# Patient Record
(demographics unavailable — no encounter records)

---

## 2025-04-17 NOTE — HISTORY OF PRESENT ILLNESS
[FreeTextEntry1] : 28yo nonbinary person designated female at birth (Dorene; they/them) presents for consultation for top surgery. States they want their chest "androgenous but maybe flatter/masc". Patient is not on T. Currently binds with a binder. Denies any recent lumps/bumps, or other breast changes. No history of breast imaging. Denies any family history of melanoma, breast, ovarian, or pancreatic cancer. Denies any family history of DVT/clots. Denies any personal or family history of issues with general anesthesia. States that nipple sensation is "somewhat important" (2/5 of importance). No plans to ever breast feed and expresses understanding that this procedure would render the patient unable to do so.  Patient works in . Lives with supportive partner who will be helpful after surgery. Endorses daily tobacco use. The patient expresses understanding of the need to completely off tobacco or nicotine products and to obtain negative urine nicotine metabolite test prior to insurance authorization. Endorses marijuana use. The patient agreed to avoid all marijuana smoking for 6 weeks before surgery and 6 weeks after surgery and to avoid all THC use for 2 weeks before and after surgery. Drinks some alcohol (2x a week) and denies any other recreational drug use. Patient denies any history of psychiatric hospitalization or ER admission.

## 2025-04-17 NOTE — END OF VISIT
Event Note [Time Spent: ___ minutes] : I have spent [unfilled] minutes of time on the encounter which excludes teaching and separately reported services.

## 2025-04-17 NOTE — PHYSICAL EXAM
[de-identified] : NAD. BMI 26 [de-identified] : Breast exam was performed with a medical chaperone present (DM). Minimal bilateral lateral chest rolls. No dominant masses, skin changes, nipple retraction, or palpable axillary lymphadenopathy. SN->N distance is 30 cm on the left and 30.5 cm on the right; width is 18 cm on the left and 19 cm on the right; N->IMF is 13 cm on the left and 13 cm on the right. Chest circumference is approximately 92 cm. There is bilateral grade 2 ptosis. [de-identified] : Normal respiratory effort.

## 2025-04-17 NOTE — ASSESSMENT
[FreeTextEntry1] : The patient is a candidate for either double incision top surgery with free nipple grafting or without nipple preservation. We may have to cross the midline during this procedure. Minimal bilateral chest wall liposuction would aid in reducing the stigmata/hollow appearance following subtotal mastectomy and also help address the lateral chest rolls, which are a common site of persistent dysphoria. They express understanding of the risks associated with surgery. To proceed, they will need 1 mental health letter of assessment and a negative nicotine test in order to proceed with insurance submission. If they decide to go with no nipples, their mental health letter needs to indicate that. They will need to decide on their goals and technique decision.   I, Dr. Riggs, personally performed the evaluation and management (E/M) services for this new patient. That E/M includes conducting the clinically appropriate initial history &/or exam, assessing all conditions, and establishing the plan of care. Today, my SAVANNAH, Cliff Gramajo PA-C, was here to observe my evaluation and management service for this patient & follow plan of care established by me going forward.

## 2025-04-17 NOTE — PHYSICAL EXAM
[de-identified] : NAD. BMI 26 [de-identified] : Breast exam was performed with a medical chaperone present (DM). Minimal bilateral lateral chest rolls. No dominant masses, skin changes, nipple retraction, or palpable axillary lymphadenopathy. SN->N distance is 30 cm on the left and 30.5 cm on the right; width is 18 cm on the left and 19 cm on the right; N->IMF is 13 cm on the left and 13 cm on the right. Chest circumference is approximately 92 cm. There is bilateral grade 2 ptosis. [de-identified] : Normal respiratory effort.

## 2025-06-19 NOTE — HISTORY OF PRESENT ILLNESS
[Suicidal Behavior/Ideation] : suicidal behavior/ideation [Not Applicable] : Not applicable [FreeTextEntry1] : pt is a 28 y/o gender non binary AFAB, lives with their partner and works as a , with hx of anxiety and depression, has responded to zoloft but at this time is not taking it, former smoker and stopped in April 2025, seeking top surgery clearance letter   pron they/them sexuality: queer  as a child grew up presenting tomboy. They reached puberty later than peer around age 15-17 y/o. They started noticing a difference and identified internally as queer in high school and came out in college. They add that during pandemic they explored their gender more and identified as non binary. They have had distress with development after puberty. Chest has been a major stress and as they grew bigger the distress increased. They have been contemplating top surgery for the past 3 years and last year binding which has reduced the distress.  They have researched the past 6 months about methods and also anecdotes of others who had it  they have support at home, their partner she will be able to help and they are able to take a month off  mental health:  anxiety and depression started in high school and pt did not get any major help. The family relationship was not the best with pt and has remained and become more estranged. Pt has an older brother.  Pt received therapy and had initially lexapro then zoloft in college which helped. they had periods of depression with SI thoughts and self harm, and ongoing anxiety increasing episodes. Afyer college they have not had mental health care and pcp continued zoloft but pt takes it intermittently , currently not on it  uses limited alcohol and has cut back, and uses limited cannabis. They have stopped smoking cigarette since started the surgery eval process and wants to try to stay away from it  no current major depressive sx, but has some anxiety,. last episode in 2023  no suicide attempt reported but has hx of self harm  no current si  uses limited clonazepam 0.5mg and propranolol 10 mg prn   [FreeTextEntry2] : see above [FreeTextEntry3] : see above

## 2025-06-19 NOTE — DISCUSSION/SUMMARY
[FreeTextEntry1] : 26 y/o with risa and mdd not in acute phase and stable  discussed psych f/u and gave info for therapy and psychiatrist  as for gender meets criteria for gd and letter will be written for top surgery   f/u as needed for gender care here  [Low acute suicide risk] : Low acute suicide risk

## 2025-06-19 NOTE — PHYSICAL EXAM
[None] : none [Cooperative] : cooperative [Euthymic] : euthymic [Full] : full [Clear] : clear [Linear/Goal Directed] : linear/goal directed [WNL] : within normal limits [de-identified] : mild anxiety